# Patient Record
Sex: FEMALE | Race: WHITE | Employment: OTHER | URBAN - METROPOLITAN AREA
[De-identification: names, ages, dates, MRNs, and addresses within clinical notes are randomized per-mention and may not be internally consistent; named-entity substitution may affect disease eponyms.]

---

## 2017-03-09 ENCOUNTER — HOSPITAL ENCOUNTER (OUTPATIENT)
Dept: ENDOSCOPY | Age: 82
Discharge: HOME | End: 2017-03-09
Attending: INTERNAL MEDICINE | Admitting: INTERNAL MEDICINE

## 2017-03-09 VITALS
HEART RATE: 77 BPM | DIASTOLIC BLOOD PRESSURE: 85 MMHG | RESPIRATION RATE: 16 BRPM | TEMPERATURE: 97.7 F | SYSTOLIC BLOOD PRESSURE: 147 MMHG | OXYGEN SATURATION: 97 %

## 2017-03-09 RX ORDER — LIDOCAINE HYDROCHLORIDE 10 MG/ML
1 INJECTION, SOLUTION INFILTRATION; PERINEURAL
Status: ACTIVE | OUTPATIENT
Start: 2017-03-09 | End: 2017-03-09

## 2017-03-09 RX ORDER — SODIUM CHLORIDE 0.9 % (FLUSH) 0.9 %
10 SYRINGE (ML) INJECTION PRN
Status: DISCONTINUED | OUTPATIENT
Start: 2017-03-09 | End: 2017-03-10 | Stop reason: HOSPADM

## 2017-03-09 RX ORDER — SODIUM CHLORIDE 9 MG/ML
INJECTION, SOLUTION INTRAVENOUS CONTINUOUS
Status: DISCONTINUED | OUTPATIENT
Start: 2017-03-09 | End: 2017-03-10 | Stop reason: HOSPADM

## 2017-03-09 RX ORDER — SODIUM CHLORIDE 0.9 % (FLUSH) 0.9 %
10 SYRINGE (ML) INJECTION EVERY 12 HOURS SCHEDULED
Status: DISCONTINUED | OUTPATIENT
Start: 2017-03-09 | End: 2017-03-10 | Stop reason: HOSPADM

## 2017-03-09 RX ADMIN — SODIUM CHLORIDE: 9 INJECTION, SOLUTION INTRAVENOUS at 09:04

## 2017-03-09 ASSESSMENT — PAIN SCALES - GENERAL
PAINLEVEL_OUTOF10: 0
PAINLEVEL_OUTOF10: 0

## 2017-03-09 ASSESSMENT — PAIN - FUNCTIONAL ASSESSMENT: PAIN_FUNCTIONAL_ASSESSMENT: 0-10

## 2024-01-01 ENCOUNTER — APPOINTMENT (OUTPATIENT)
Dept: GENERAL RADIOLOGY | Age: 89
End: 2024-01-01
Payer: MEDICARE

## 2024-01-01 ENCOUNTER — HOSPITAL ENCOUNTER (EMERGENCY)
Age: 89
End: 2024-03-27
Attending: EMERGENCY MEDICINE
Payer: MEDICARE

## 2024-01-01 VITALS
OXYGEN SATURATION: 90 % | TEMPERATURE: 100.2 F | HEART RATE: 152 BPM | RESPIRATION RATE: 13 BRPM | DIASTOLIC BLOOD PRESSURE: 51 MMHG | SYSTOLIC BLOOD PRESSURE: 74 MMHG

## 2024-01-01 DIAGNOSIS — E87.5 HYPERKALEMIA: ICD-10-CM

## 2024-01-01 DIAGNOSIS — Z51.5 HOSPICE CARE: ICD-10-CM

## 2024-01-01 DIAGNOSIS — Z66 DNR (DO NOT RESUSCITATE): ICD-10-CM

## 2024-01-01 DIAGNOSIS — R41.82 ALTERED MENTAL STATUS, UNSPECIFIED ALTERED MENTAL STATUS TYPE: Primary | ICD-10-CM

## 2024-01-01 DIAGNOSIS — S81.809A NON-HEALING WOUND OF LOWER EXTREMITY, UNSPECIFIED LATERALITY, INITIAL ENCOUNTER: ICD-10-CM

## 2024-01-01 DIAGNOSIS — Z51.5 COMFORT MEASURES ONLY STATUS: ICD-10-CM

## 2024-01-01 DIAGNOSIS — J18.9 PNEUMONIA OF LEFT LOWER LOBE DUE TO INFECTIOUS ORGANISM: ICD-10-CM

## 2024-01-01 LAB
EKG DIAGNOSIS: NORMAL
EKG Q-T INTERVAL: 282 MS
EKG QRS DURATION: 76 MS
EKG QTC CALCULATION (BAZETT): 400 MS
EKG R AXIS: 251 DEGREES
EKG T AXIS: 63 DEGREES
EKG VENTRICULAR RATE: 121 BPM
FLUAV RNA UPPER RESP QL NAA+PROBE: NEGATIVE
FLUBV AG NPH QL: NEGATIVE
GLUCOSE BLD-MCNC: 117 MG/DL (ref 70–99)
PERFORMED ON: ABNORMAL
SARS-COV-2 RDRP RESP QL NAA+PROBE: NOT DETECTED

## 2024-01-01 PROCEDURE — 6370000000 HC RX 637 (ALT 250 FOR IP): Performed by: EMERGENCY MEDICINE

## 2024-01-01 PROCEDURE — 87150 DNA/RNA AMPLIFIED PROBE: CPT

## 2024-01-01 PROCEDURE — 96374 THER/PROPH/DIAG INJ IV PUSH: CPT

## 2024-01-01 PROCEDURE — 99285 EMERGENCY DEPT VISIT HI MDM: CPT

## 2024-01-01 PROCEDURE — 87635 SARS-COV-2 COVID-19 AMP PRB: CPT

## 2024-01-01 PROCEDURE — 87804 INFLUENZA ASSAY W/OPTIC: CPT

## 2024-01-01 PROCEDURE — 93010 ELECTROCARDIOGRAM REPORT: CPT | Performed by: INTERNAL MEDICINE

## 2024-01-01 PROCEDURE — 93005 ELECTROCARDIOGRAM TRACING: CPT | Performed by: EMERGENCY MEDICINE

## 2024-01-01 PROCEDURE — 87040 BLOOD CULTURE FOR BACTERIA: CPT

## 2024-01-01 PROCEDURE — 6360000002 HC RX W HCPCS: Performed by: EMERGENCY MEDICINE

## 2024-01-01 PROCEDURE — 71045 X-RAY EXAM CHEST 1 VIEW: CPT

## 2024-01-01 PROCEDURE — 2580000003 HC RX 258: Performed by: EMERGENCY MEDICINE

## 2024-01-01 RX ORDER — 0.9 % SODIUM CHLORIDE 0.9 %
1000 INTRAVENOUS SOLUTION INTRAVENOUS ONCE
Status: COMPLETED | OUTPATIENT
Start: 2024-01-01 | End: 2024-01-01

## 2024-01-01 RX ORDER — LORAZEPAM 1 MG/1
1 TABLET ORAL 3 TIMES DAILY PRN
Qty: 20 TABLET | Refills: 0 | Status: SHIPPED | OUTPATIENT
Start: 2024-01-01 | End: 2024-01-01

## 2024-01-01 RX ORDER — LEVOFLOXACIN 750 MG/1
750 TABLET, FILM COATED ORAL DAILY
Qty: 6 TABLET | Refills: 0 | Status: SHIPPED | OUTPATIENT
Start: 2024-01-01 | End: 2024-03-28

## 2024-01-01 RX ORDER — LORAZEPAM 2 MG/ML
0.5 INJECTION INTRAMUSCULAR EVERY 4 HOURS PRN
Qty: 30 ML | Refills: 0 | Status: SHIPPED | OUTPATIENT
Start: 2024-01-01 | End: 2024-03-28

## 2024-01-01 RX ORDER — LEVOFLOXACIN 750 MG/1
750 TABLET, FILM COATED ORAL DAILY
Qty: 6 TABLET | Refills: 0 | Status: SHIPPED | OUTPATIENT
Start: 2024-01-01 | End: 2024-01-01

## 2024-01-01 RX ORDER — ACETAMINOPHEN 500 MG
1000 TABLET ORAL ONCE
Status: COMPLETED | OUTPATIENT
Start: 2024-01-01 | End: 2024-01-01

## 2024-01-01 RX ORDER — OXYCODONE HYDROCHLORIDE 5 MG/1
5 TABLET ORAL EVERY 6 HOURS PRN
Qty: 20 TABLET | Refills: 0 | Status: SHIPPED | OUTPATIENT
Start: 2024-01-01 | End: 2024-01-01

## 2024-01-01 RX ORDER — LEVOFLOXACIN 5 MG/ML
750 INJECTION, SOLUTION INTRAVENOUS ONCE
Status: COMPLETED | OUTPATIENT
Start: 2024-01-01 | End: 2024-01-01

## 2024-01-01 RX ORDER — LORAZEPAM 2 MG/ML
1 INJECTION INTRAMUSCULAR EVERY 4 HOURS PRN
Status: DISCONTINUED | OUTPATIENT
Start: 2024-01-01 | End: 2024-01-01 | Stop reason: HOSPADM

## 2024-01-01 RX ORDER — ONDANSETRON 4 MG/1
4 TABLET, ORALLY DISINTEGRATING ORAL EVERY 8 HOURS PRN
Qty: 20 TABLET | Refills: 0 | Status: SHIPPED | OUTPATIENT
Start: 2024-01-01 | End: 2024-03-28

## 2024-01-01 RX ORDER — ONDANSETRON 2 MG/ML
4 INJECTION INTRAMUSCULAR; INTRAVENOUS EVERY 6 HOURS PRN
Status: DISCONTINUED | OUTPATIENT
Start: 2024-01-01 | End: 2024-01-01 | Stop reason: HOSPADM

## 2024-01-01 RX ORDER — PROCHLORPERAZINE EDISYLATE 5 MG/ML
5 INJECTION INTRAMUSCULAR; INTRAVENOUS EVERY 6 HOURS PRN
Status: DISCONTINUED | OUTPATIENT
Start: 2024-01-01 | End: 2024-01-01 | Stop reason: HOSPADM

## 2024-01-01 RX ORDER — MORPHINE SULFATE 100 MG/5ML
5 SOLUTION ORAL EVERY 4 HOURS PRN
Qty: 30 ML | Refills: 0 | Status: SHIPPED | OUTPATIENT
Start: 2024-01-01 | End: 2024-03-28

## 2024-01-01 RX ADMIN — SODIUM CHLORIDE 1000 ML: 9 INJECTION, SOLUTION INTRAVENOUS at 10:58

## 2024-01-01 RX ADMIN — LEVOFLOXACIN 750 MG: 5 INJECTION, SOLUTION INTRAVENOUS at 12:07

## 2024-01-01 RX ADMIN — HYDROMORPHONE HYDROCHLORIDE 0.5 MG: 1 INJECTION, SOLUTION INTRAMUSCULAR; INTRAVENOUS; SUBCUTANEOUS at 15:38

## 2024-01-01 RX ADMIN — SODIUM ZIRCONIUM CYCLOSILICATE 5 G: 5 POWDER, FOR SUSPENSION ORAL at 10:58

## 2024-01-01 RX ADMIN — ACETAMINOPHEN 1000 MG: 500 TABLET ORAL at 10:58

## 2024-03-27 NOTE — DISCHARGE INSTRUCTIONS
We are sending a printed prescription with Ms. Wells.    We have gotten palliative / hospice care providers involved with her care.    She should not have plain / dry gauze applied to her chronic wounds as this makes her go through unnecessary agony during her dressing changes. Please only apply non-adherent dressings (ex: petroleum gauze, oil emulsion dressings, Xeroform, Adaptic, etc.) directly to her wounds. We have sent with her examples of some products that would be appropriate for her wound care.    Be sure to contact her primary care provider to arrange for a follow up visit.    If she has any comfort care needs that can not be met at your facility or if her goals of care change she is welcome back here for reevaluation at any time 24/7!

## 2024-03-27 NOTE — ED NOTES
Spoke with Otf at  office. This RN needs to call PCP to see what he is putting on death certificate. Will call him back.

## 2024-03-27 NOTE — CARE COORDINATION
SW advised that patient and/or family requesting palliative care and/or hospice but they aren't exactly sure what they want.     Patient is from [facility] and will likely be returned with either service.     SW reached out to palliative care nurse for direction. She will reach out to family and let us know how to proceed. In the interim we will call the facility.     Respectfully submitted,    Maricruz RAINES, MANUEL  [facility]   [phone]    Electronically signed by YANNA Starr, JULIANA on 3/27/2024 at 11:27 AM

## 2024-03-27 NOTE — DISCHARGE INSTR - COC
Continuity of Care Form    Patient Name: Sandra Wells   :  1931  MRN:  [id number]    Admit date:  3/27/2024  Discharge date:  3/27/2024    Code Status Order: DNR-CC   Advance Directives:     Admitting Physician:  No admitting provider for patient encounter.  PCP: Cayden Sales    Discharging Nurse: Electronically signed by Esther Nguyen RN on 3/27/2024 at 4:10 PM    Discharging Hospital Unit/Room#: 040/E-40  Discharging Unit Phone Number: [phone]    Emergency Contact:   Extended Emergency Contact Information  Primary Emergency Contact: Georgia Gutierrez  Address: [address] [place] [address] [facility]  Home Phone: [phone]  Mobile Phone: [phone]  Relation: Niece/Nephew  Secondary Emergency Contact: Meme Escobedo  Address: [address] [place] [address]  Home Phone: [phone]  Mobile Phone: [phone]  Relation: Niece/Nephew    Past Surgical History:  Past Surgical History:   Procedure Laterality Date    APPENDECTOMY      BREAST SURGERY Bilateral     cysts    ENDOSCOPY, COLON, DIAGNOSTIC      EYE SURGERY      cataracts    TONSILLECTOMY      UPPER GASTROINTESTINAL ENDOSCOPY  2017    Dr Thapa       Immunization History:   Immunization History   Administered Date(s) Administered    COVID-19, PFIZER GRAY top, DO NOT Dilute, (age 12 y+), IM, 30 mcg/0.3 mL 2022    COVID-19, PFIZER PURPLE top, DILUTE for use, (age 12 y+), 30mcg/0.3mL 2021, 2021       Active Problems:  Patient Active Problem List   Diagnosis Code   (none) - all problems resolved or deleted       Isolation/Infection:   Isolation            No Isolation           Unreconciled Outside Infections       Enable clinical decision support by reconciling outside information with the patient's chart.    .      Infection Onset Last Indicated Last Received Source    COVID-19 23 [facility]          Patient Infection Status

## 2024-03-27 NOTE — ED PROVIDER NOTES
I was called to Ms. Wells's room at around 1840. She was noted to be pulseless and with pupils fixed and dilated. No spontaneous respirations. Her CODE STATUS is DNR-CC. Time of death called at 1843. A moment of silence was held in memory of the patient. The patient's niece was present at bedside.      Napoleon Spencer MD  03/27/24 1846    
allergies, and vital signs.    Treatments:  Medications   ondansetron (ZOFRAN) injection 4 mg (has no administration in time range)   prochlorperazine (COMPAZINE) injection 5 mg (has no administration in time range)   HYDROmorphone (DILAUDID) injection 1 mg (has no administration in time range)   LORazepam (ATIVAN) injection 1 mg (has no administration in time range)   sodium chloride 0.9 % bolus 1,000 mL (0 mLs IntraVENous Stopped 3/27/24 1207)   sodium zirconium cyclosilicate (LOKELMA) oral suspension 5 g (5 g Oral Given 3/27/24 1058)   acetaminophen (TYLENOL) tablet 1,000 mg (1,000 mg Oral Given 3/27/24 1058)   levoFLOXacin (LEVAQUIN) 750 MG/150ML infusion 750 mg (0 mg IntraVENous Stopped 3/27/24 1337)     Disposition:  On arrival to the ED Ms. Wells was noted to be very ill-appearing with lethargy, fever, and tachycardia.  During her time in the emergency department thus far today her condition has actually worsened significantly as she has become hypotensive and developed a new supplemental oxygen requirement.  I spoke multiple times over the phone with her HCPOA.  She agrees that proceeding with extensive diagnostic testing (ex: repeat lab testing, CT imaging, etc.) would not be beneficial given Ms. Wells's goals of care.  She does feel that treatment with IV fluids and antimicrobial therapy would be reasonable but does not feel that we should escalate care further than that with treatments such as vasopressors or other life support measures.  Discussed exam findings, test results, diagnoses, poor prognosis, and expected clinical course at length with Ms. Wells's niece & HCPOA.  Advised that proceeding with palliative / hospice care would be most appropriate given her current severe illness, poor prognosis, and goals of care.  She is in agreement with this plan.  Social work and palliative care providers have been consulted and actively involved in Ms. Monges care. We are awaiting palliative

## 2024-03-27 NOTE — ED NOTES
Family called out to nurse station. Patient not breathing. Dr Spencer to bedside. Pronounced patient, time of death 1843.

## 2024-03-27 NOTE — CONSULTS
[facility]  Palliative Care   Consult Note    NAME:  Sandra Wells  MEDICAL RECORD NUMBER:  [id number]  AGE: 89 y.o.   GENDER: female  : 1931  TODAY'S DATE:  3/27/2024    Subjective     Reason for Consult:  goals of care, hospice discussion, and code statusn  Visit Type: Initial Consult      Sandra Wells is a 89 y.o. female referred by:   [x] Physician    PAST MEDICAL HISTORY      Diagnosis Date    Arthritis     Cancer (HCC)     basal cell face    Hepatitis A     Hyperlipidemia     Hypertension     Wound, open     Right inner ankle        PAST SURGICAL HISTORY  Past Surgical History:   Procedure Laterality Date    APPENDECTOMY      BREAST SURGERY Bilateral     cysts    ENDOSCOPY, COLON, DIAGNOSTIC      EYE SURGERY      cataracts    TONSILLECTOMY      UPPER GASTROINTESTINAL ENDOSCOPY  2017    Dr Thapa       FAMILY HISTORY  Family History   Problem Relation Age of Onset    Heart Disease Father     Cancer Sister     Colon Cancer Sister     Heart Disease Brother        SOCIAL HISTORY  Social History     Tobacco Use    Smoking status: Former    Tobacco comments:     quit    Substance Use Topics    Alcohol use: Yes     Comment: rare    Drug use: No       ALLERGIES  Allergies   Allergen Reactions    Penicillins      Leg edema  Red skin    Adhesive Tape Rash       MEDICATIONS  No current facility-administered medications on file prior to encounter.     Current Outpatient Medications on File Prior to Encounter   Medication Sig Dispense Refill    b complex vitamins capsule Take 1 capsule by mouth daily      zinc sulfate (ZINCATE) 220 MG capsule Take 220 mg by mouth daily      HYDROcodone-acetaminophen (NORCO) 5-325 MG per tablet Take 1 tablet by mouth every 6 hours as needed for Pain (takes 2.5-325mg) .      Cholecalciferol (VITAMIN D3) 2000 UNITS CAPS Take 2,000 Units by mouth daily      Chlorpheniramine-Acetaminophen (CORICIDIN HBP COLD/FLU PO) Take  by mouth as needed.

## 2024-03-27 NOTE — CARE COORDINATION
CORDELL advised that referral was sent to [facility]. SW faxed a formal referral to ensure that they have the hospice order and reached out to onsite liaison Irina Oliva. Irina is aware of patient and family appears to want to do off site consents. She has two patients ahead of her and will reach out to family.     When hospice is officially consented and on board we can set up transport. CORDELL will print a portable DNR form to prepare for this process.     Respectfully submitted,    Maricruz Good-CHATO Michelle-S  [facility]   [phone]    Electronically signed by YANNA Starr, JULIANA on 3/27/2024 at 12:16 PM

## 2024-03-27 NOTE — CARE COORDINATION
Patient is from long term care, memory unit and just transferred from independent living. CORDELL spoke with Tamara in admissions at [facility] who advised that she was private pay and can return with our without hospice.     We are waiting on palliative care to let us know. CORDELL did advise that if we do not need to medically admit her we would send referrals, HOWEVER, they will NOT be meeting at [facility].     Tamara can be reached at [phone].     Respectfully submitted,    Maricruz RAINES, CHATO-S  [facility]   [phone]    Electronically signed by YANNA Starr, JULIANA on 3/27/2024 at 11:28 AM

## 2024-03-27 NOTE — CARE COORDINATION
Case Management Discharge Note          Date / Time of Note: 3/27/2024 2:32 PM                  Patient Name: Sandra Wells   YOB: 1931  Diagnosis: No admission diagnoses are documented for this encounter.   Date / Time: 3/27/2024 10:24 AM    Financial:  Payor: [facility] MEDICARE / Plan: [facility] MEDICARE ADVANTAGE / Product Type: *No Product type* /      Pharmacy:    [facility] - [place], [place] - [address] - P [phone] - F [phone]  [address]  [facility] [address]  Phone: [phone] Fax: [phone]    Assistance purchasing medications?:    Assistance provided by Case Management: None at this time    DISCHARGE Disposition: Long Term Care Facility (LTC)    Hospice Services:  Location: Home  Agency: [facility]  Phone: [phone]    Consents signed: Yes    Referrals made at DISCHARGE for outpatient continued care:  Not Applicable    Transportation:  Transportation PLAN for discharge: EMS transportation   Mode of Transport: Ambulance stretcher - BLS  Reason for medical transport: Other: dementia, confused.   Name of Transport Company: [facility]  Phone: [phone]  Time of Transport: 9506-1429    Transport form completed: Yes    IMM Completed: Not Indicated    Additional CM Notes:     The Plan for Transition of Care is related to the following treatment goals of No admission diagnoses are documented for this encounter.    Respectfully submitted,    Maricruz RAINES, MANUEL  [facility]   [phone]    Electronically signed by YANNA Starr, CARLEYW on 3/27/2024 at 2:30 PM

## 2024-03-27 NOTE — CONSULTS
[facility]             Liaison met with pt and family at bedside to discuss HOC philosophy, services and LOC. Emotional support given. Pt is confused, weak and unable to sign consent for HOC. Georgia signed consent for HOC and wants her to go back to [facility] today with HOC services.  Updated RN Esther, who will call report to LTC, and SILVER/CORDELL Alcantara. Liaison will fax AVS/MELLISSA, DNR-CC and scripts to LTC.  [facility] will transfer pt to LTC [facility]  today at 6:45pm.  Admit RN scheduled for tomorrow at 11am. Family updated.         Thank you for this referral,   Please call HOC with questions/ concerns at [phone]  Jade Oliva RN   HOC Admissions Liaison

## 2024-03-28 LAB — REPORT: NORMAL

## 2024-03-30 LAB
BACTERIA BLD CULT ORG #2: ABNORMAL
BACTERIA BLD CULT: ABNORMAL
BACTERIA BLD CULT: ABNORMAL
ORGANISM: ABNORMAL

## 2024-03-30 NOTE — RESULT ENCOUNTER NOTE
Blood culture results reviewed, positive in two bottles for [employer] [facility]. Review of medical record shows patient .